# Patient Record
Sex: MALE
[De-identification: names, ages, dates, MRNs, and addresses within clinical notes are randomized per-mention and may not be internally consistent; named-entity substitution may affect disease eponyms.]

---

## 2022-05-06 ENCOUNTER — NURSE TRIAGE (OUTPATIENT)
Dept: OTHER | Facility: CLINIC | Age: 62
End: 2022-05-06

## 2022-05-07 NOTE — TELEPHONE ENCOUNTER
Subjective: Caller states \"He wa recently RX Paxlovid for COVID. He also take Buspirone 10 mg BID. He might be having a drug interaction. \"       Current Symptoms: anxious, dizzy. Onset:   tonight     Associated Symptoms: irritability     Pain Severity: NA    Temperature: NA     What has been tried: walking    Caller states she would just have him stop the Buspirone, \"is what I would do. \"    Advised to speak with provider who prescribed both medications. Phillip drug interactions does recommend \"Consider therapy modifications. \"   Rating is D. States she will contact the on call provide. Recommended disposition: call provider on call, now    Care advice provided, patient verbalizes understanding; denies any other questions or concerns; instructed to call back for any new or worsening symptoms. Patient/caller agrees to follow-up with PCP     This triage is a result of a call to [address] [place] [address]. Please do not respond to the triage nurse through this encounter. Any subsequent communication should be directly with the patient. Reminders:    Be Sure to use [employer] if appropriate     Be sure to verify MMO ID, Name, and  against [employer] for ALL callers     Care Advice - both instruction and documentation    Call origin   MMO ID found in Tableau: Client MMO, be sure to document MMO ID (digits ONLY) in comment section of call origin row    MMO ID, Name/ NOT found in Tableau: Non-participant MMO Caller, leave comment section of call origin row BLANK    Closing script - If there is nothing else I can help you with? --- Please remain on the line after I disconnect to complete a brief 1 question survey. We appreciate your feedback. Thank you for allowing [facility] to partner in your care.     PLEASE DELETE ALL RED TEXT PRIOR TO SIGNING NOTE    Reason for Disposition   [1] Caller has URGENT medicine question about med that PCP or specialist prescribed AND [2] triager unable to answer question    Protocols used: MEDICATION QUESTION CALL-ADULT-